# Patient Record
Sex: MALE | Race: WHITE | NOT HISPANIC OR LATINO | Employment: FULL TIME | URBAN - METROPOLITAN AREA
[De-identification: names, ages, dates, MRNs, and addresses within clinical notes are randomized per-mention and may not be internally consistent; named-entity substitution may affect disease eponyms.]

---

## 2020-03-06 ENCOUNTER — OFFICE VISIT (OUTPATIENT)
Dept: INTERNAL MEDICINE | Facility: CLINIC | Age: 25
End: 2020-03-06

## 2020-03-06 VITALS
TEMPERATURE: 98 F | SYSTOLIC BLOOD PRESSURE: 132 MMHG | DIASTOLIC BLOOD PRESSURE: 82 MMHG | WEIGHT: 183.63 LBS | HEART RATE: 79 BPM | OXYGEN SATURATION: 98 % | HEIGHT: 66 IN | BODY MASS INDEX: 29.51 KG/M2

## 2020-03-06 DIAGNOSIS — R19.7 DIARRHEA OF PRESUMED INFECTIOUS ORIGIN: Primary | ICD-10-CM

## 2020-03-06 PROCEDURE — 99203 OFFICE O/P NEW LOW 30 MIN: CPT | Mod: PBBFAC,PO | Performed by: FAMILY MEDICINE

## 2020-03-06 PROCEDURE — 99203 PR OFFICE/OUTPT VISIT, NEW, LEVL III, 30-44 MIN: ICD-10-PCS | Mod: S$PBB,,, | Performed by: FAMILY MEDICINE

## 2020-03-06 PROCEDURE — 99999 PR PBB SHADOW E&M-NEW PATIENT-LVL III: CPT | Mod: PBBFAC,,, | Performed by: FAMILY MEDICINE

## 2020-03-06 PROCEDURE — 99203 OFFICE O/P NEW LOW 30 MIN: CPT | Mod: S$PBB,,, | Performed by: FAMILY MEDICINE

## 2020-03-06 PROCEDURE — 99999 PR PBB SHADOW E&M-NEW PATIENT-LVL III: ICD-10-PCS | Mod: PBBFAC,,, | Performed by: FAMILY MEDICINE

## 2020-03-06 RX ORDER — METRONIDAZOLE 500 MG/1
500 TABLET ORAL EVERY 12 HOURS
Qty: 40 TABLET | Refills: 0 | Status: SHIPPED | OUTPATIENT
Start: 2020-03-06 | End: 2020-04-20 | Stop reason: SDUPTHER

## 2020-03-06 NOTE — PATIENT INSTRUCTIONS
Clostridium Difficile Infection  Clostridium difficile (C. diff) bacteria can be very harmful. They affect the intestinal tract. They can cause symptoms ranging from mild diarrhea to severe inflammation of the large intestine (colon). C. diff infection is most common during the days and weeks after treatment with antibiotics. Anyone can become infected. But the risk is greatly increased for people in hospitals and for people living in nursing homes or long-term care facilities. This is because antibiotic use is common there. Germs also spread easily in these places.    What causes C. diff infection?  The stomach and intestines have hundreds of kinds of bacteria. Many of these bacteria actually help keep harmful bacteria like C. diff from causing problems. Small amounts of C. diff are normal in the intestine and dont cause problems. When you take an antibiotic, the normal balance of good and bad bacteria may be affected. There may be too few good bacteria and too many harmful bacteria like C diff. In hospitals and nursing homes, C. diff may be spread from an infected person to others. This can happen when staff or visitors touch infected people or objects such as bed rails, stethoscopes, or bedpans and then touch other people or surfaces.  What are the symptoms of C. diff infection?  About half of people with C. diff infection have no symptoms. Yet they can still pass the infection to others. Others do have symptoms. These include:  · Watery diarrhea, which may contain mucus  · Pain and cramping  · Fever  Some who are infected develop serious problems. Symptoms include:  · Belly (abdominal) pain  · Abdominal swelling  · Nausea and vomiting  · Little or no diarrhea  How is C. diff infection diagnosed?  To confirm the infection, a sample of stool is tested for the bacteria or the toxins made by the bacteria.  How is C. diff infection treated?  Your healthcare provider will tell you to stop taking any antibiotics you  have been prescribed, based on your healthcare needs. He or she may prescribe different medicines as needed. In certain cases, you may be given an antibiotic directed at the C. diff infection. Talk with your healthcare provider before stopping or starting any medicines.  · Fluids are often given by IV (intravenously) through a vein. This helps replace fluids lost through diarrhea.  · In rare cases you may need surgery if treatment doesnt cure severe symptoms  To lessen symptoms:  · Drink plenty of fluids to replace water lost through diarrhea. Talk with your healthcare provider or nurse about which fluids are best.  · Follow your healthcare providers instructions for when and what to eat.  · Unless your healthcare provider tells you to do so, don't take medicines for diarrhea.  · Tell your healthcare provider if symptoms return. Even after treatment, C. diff may come back.  Your doctor may give you an additional medicine if your symptoms come back or you are at risk for another C. diff infection. This medicine is called bezlotoxumab. It is not an antibiotic, but it can help keep your C. diff symptoms from returning.  What are the complications of C. diff infection?  Complications include:  · Dehydration  · Electrolyte imbalances  · Low protein in the blood  · Severe widening (dilation) of the large intestine  · A hole (perforation) in the bowel  · Low blood pressure  · Kidney failure  · Inflammation or infection all over the body  · Death  How is C. diff prevented?  Hospitals and nursing homes take these steps to help prevent C. diff infections:  · Limiting use of antibiotics. Giving antibiotics only when needed can help reduce C. diff infections.  · Handwashing. Hospital staff should wash their hands before and after treating each person. They should also wash their hands after touching any surface in someone's' room. Soap and water work better than alcohol-based hand .  · Protective clothing. Healthcare  workers should wear gloves and a gown when entering the room of someone with C. diff infection. They should remove these items before leaving and then wash their hands.  · Private rooms. People with C. diff should be in private rooms. Or they may share rooms with others who have the same infection.  · Thorough cleaning. Equipment and rooms should be cleaned and disinfected every day.  · Education. Everyone should be shown the best ways to avoid infection.  You can do the following to help prevent C. diff:  · Take antibiotics only when you really need them. Antibiotics dont help treat illnesses caused by viruses. This includes colds and the flu. Dont ask for antibiotics from your healthcare provider if he or she says they wont work.  · When you are given antibiotics, take them as directed. Dont take more or less than the dosage prescribed. Do not take them for shorter or longer than your provider tells you to, even if you feel better.  · Wash your hands carefully. Do this after using the bathroom and before eating. Use plenty of soap and warm water. Alcohol-based hand  may not work against C. diff germs.  Everyone can help prevent C. diff:  In a hospital or care facility:  · Wash your hands well before and after visiting someone who has C. diff infection. Use soap and water. Alcohol-based hand  may not work against C. diff.  · If the staff asks you to, wear gloves. Take any other steps you are asked to follow to help prevent infection.  At home:  · If instructed, wear gloves when caring for a family member with C. diff infection. Throw the gloves away after each use. Then wash your hands well.  · Wash the persons clothes, bed linen, and towels separately. Use hot water. Use both detergent and liquid bleach.  · Disinfect surfaces in the persons room. This includes the phone, light switches, and remote controls.  Practice good handwashing:  · Use warm water and plenty of soap. Rub your hands  together well.  · Clean your whole hand. Wash under nails, between fingers, and up your wrists.  · Wash for at least 15 seconds to 20 seconds.   · Rinse. Let the water run down your fingers, not up your wrists.  · Dry your hands well. Then use a paper towel to turn off the faucet and open the door.  Date Last Reviewed: 1/1/2017  © 4722-9748 [employer]20, [employer]. [address] [place] [address], [place] [address]. All rights reserved. This information is not intended as a substitute for professional medical care. Always follow your healthcare professional's instructions.

## 2020-03-08 NOTE — PROGRESS NOTES
Subjective:      Patient ID: Danita Lui is a 25 y.o. male.    Chief Complaint: Diarrhea      Patient reports abdominal cramping, yellow soft frequent stool, chills for about 2 days now.  He reports stools are loose but not yet liquid, however they seem to be worsening.  His father, whom he he lives with, was recently diagnosed Clostridium difficile infection.    Review of Systems   Constitutional: Positive for chills and fatigue. Negative for fever and unexpected weight change.   Gastrointestinal: Positive for abdominal pain and diarrhea. Negative for constipation and vomiting.     History reviewed. No pertinent past medical history.       History reviewed. No pertinent surgical history.  Family History   Problem Relation Age of Onset    Heart disease Father      Social History     Socioeconomic History    Marital status: Single     Spouse name: Not on file    Number of children: Not on file    Years of education: Not on file    Highest education level: Not on file   Occupational History    Not on file   Social Needs    Financial resource strain: Not on file    Food insecurity:     Worry: Not on file     Inability: Not on file    Transportation needs:     Medical: Not on file     Non-medical: Not on file   Tobacco Use    Smoking status: Never Smoker    Smokeless tobacco: Never Used   Substance and Sexual Activity    Alcohol use: Not Currently    Drug use: Never    Sexual activity: Yes     Partners: Female   Lifestyle    Physical activity:     Days per week: Not on file     Minutes per session: Not on file    Stress: Not on file   Relationships    Social connections:     Talks on phone: Not on file     Gets together: Not on file     Attends L[employer]-day service: Not on file     Active member of club or organization: Not on file     Attends meetings of clubs or organizations: Not on file     Relationship status: Not on file   Other Topics Concern    Not on file   Social History Narrative    Not on  "file     Review of patient's allergies indicates:   Allergen Reactions    Pepto-bismol [bismuth subsalicylate] Nausea And Vomiting       Objective:       /82 (BP Location: Right arm)   Pulse 79   Temp 98.4 °F (36.9 °C) (Tympanic)   Ht 5' 6" (1.676 m)   Wt 83.3 kg (183 lb 10.3 oz)   SpO2 98%   BMI 29.64 kg/m²   Physical Exam   Constitutional: He is oriented to person, place, and time. He appears well-developed and well-nourished. No distress.   HENT:   Head: Normocephalic.   Right Ear: Hearing, tympanic membrane, external ear and ear canal normal.   Left Ear: Hearing, tympanic membrane, external ear and ear canal normal.   Nose: Nose normal. Right sinus exhibits no maxillary sinus tenderness and no frontal sinus tenderness. Left sinus exhibits no maxillary sinus tenderness and no frontal sinus tenderness.   Mouth/Throat: Uvula is midline, oropharynx is clear and moist and mucous membranes are normal. No oropharyngeal exudate.   Eyes: Pupils are equal, round, and reactive to light. Conjunctivae and EOM are normal.   Neck: Normal range of motion. Neck supple.   Cardiovascular: Normal rate and regular rhythm.   Pulmonary/Chest: Effort normal and breath sounds normal. No respiratory distress.   Abdominal: Soft. Bowel sounds are normal. He exhibits no mass. There is no tenderness. There is no rebound and no guarding. No hernia.   Neurological: He is alert and oriented to person, place, and time.   Skin: Skin is warm and dry. He is not diaphoretic.   Psychiatric: He has a normal mood and affect.   Nursing note and vitals reviewed.    Assessment:     1. Diarrhea of presumed infectious origin      Plan:   Diarrhea of presumed infectious origin    Other orders  -     metroNIDAZOLE (FLAGYL) 500 MG tablet; Take 1 tablet (500 mg total) by mouth every 12 (twelve) hours. for 20 days  Dispense: 40 tablet; Refill: 0     He is to return if symptoms do not resolve  Medication List with Changes/Refills   New Medications    " METRONIDAZOLE (FLAGYL) 500 MG TABLET    Take 1 tablet (500 mg total) by mouth every 12 (twelve) hours. for 20 days

## 2020-03-10 ENCOUNTER — TELEPHONE (OUTPATIENT)
Dept: INTERNAL MEDICINE | Facility: CLINIC | Age: 25
End: 2020-03-10

## 2020-03-10 NOTE — TELEPHONE ENCOUNTER
----- Message from Cinthia Figueroa sent at 3/10/2020  1:22 PM CDT -----  Contact: pt  Pt would like a call back to see how long he will be contagious . Pt can be reached at [phone]      Thanks,  Cinthia Figueroa

## 2020-03-10 NOTE — TELEPHONE ENCOUNTER
Patient called to ask how long contagious.     Spoke with Dr. Tobar and she stated that the patient must complete at least 10 days of his abt.     Patient was informed and verbalized understanding

## 2020-04-17 ENCOUNTER — TELEPHONE (OUTPATIENT)
Dept: INTERNAL MEDICINE | Facility: CLINIC | Age: 25
End: 2020-04-17

## 2020-04-17 RX ORDER — VANCOMYCIN HYDROCHLORIDE 125 MG/1
125 CAPSULE ORAL EVERY 6 HOURS
Qty: 56 CAPSULE | Refills: 0 | Status: SHIPPED | OUTPATIENT
Start: 2020-04-17 | End: 2020-04-20

## 2020-04-17 NOTE — TELEPHONE ENCOUNTER
----- Message from Shelley Campoverde sent at 4/17/2020  4:42 PM CDT -----  Contact: pt  Would like to consult with nurse regarding some questions she have, will not give any additional information. Please give a call back at [phone].            Thanks,  Shelley JETT

## 2020-04-17 NOTE — TELEPHONE ENCOUNTER
Patient called to state that he saw you regarding c-diff and it's coming back. He stated that he's been having diarrhea and soft stools for about 4/5 times a day for a few days.     He stated that his dad had a bm accident once.    Please advise

## 2020-04-17 NOTE — TELEPHONE ENCOUNTER
Please let them know I will call in vanc to their pharmacy - which one do they want to use? And recheck dad's stool test on Monday.

## 2020-04-20 ENCOUNTER — TELEPHONE (OUTPATIENT)
Dept: INTERNAL MEDICINE | Facility: CLINIC | Age: 25
End: 2020-04-20

## 2020-04-20 RX ORDER — METRONIDAZOLE 500 MG/1
500 TABLET ORAL EVERY 12 HOURS
Qty: 40 TABLET | Refills: 0 | Status: SHIPPED | OUTPATIENT
Start: 2020-04-20 | End: 2020-05-10

## 2020-04-20 NOTE — TELEPHONE ENCOUNTER
I spoke to patient's father this morning and he wanted to know if you can send in Flagyl instead of Vancomycin. He said he doesn't have insurance and the medication is to expensive. Flagyl worked well for him last time. He would like the prescription to go to [facility].

## 2020-04-20 NOTE — TELEPHONE ENCOUNTER
Spoke with patient and informed him of what the provider stated. Patient verbalized understanding

## 2020-05-18 ENCOUNTER — TELEPHONE (OUTPATIENT)
Dept: INTERNAL MEDICINE | Facility: CLINIC | Age: 25
End: 2020-05-18

## 2020-05-18 NOTE — TELEPHONE ENCOUNTER
I spoke to patient's father. He will call to find out which location is cheaper. They will call and us know which location is cheaper.

## 2020-05-18 NOTE — TELEPHONE ENCOUNTER
Called and spoken with pt, pt states he had c-diff about 2 month ago, symptoms went away but now he is experiencing stomach issue, pt c/o stomach pain after eating. Pt also think he has thrush, for tongue remains white even after brushing. Please advise

## 2020-05-18 NOTE — TELEPHONE ENCOUNTER
----- Message from Tamiko Jonatan sent at 5/18/2020 10:46 AM CDT -----  Contact: self  Type:  Needs Medical Advice    Who Called: Danita  Symptoms (please be specific): C, Diff   How long has patient had these symptoms:  Since March  Pharmacy name and phone #:    [facility] - [place], [place] - [address]  [address]  [facility] [address]  Phone: [phone] Fax: [phone]    [id number] - [facility] [address] [facility]  [address] [facility] [address]  Phone: [phone] Fax: [phone]    Would the patient rather a call back or a response via [facility]? call  Best Call Back Number: [phone]  Additional Information:

## 2020-05-18 NOTE — TELEPHONE ENCOUNTER
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 [facility]

## 2020-05-26 ENCOUNTER — TELEPHONE (OUTPATIENT)
Dept: INTERNAL MEDICINE | Facility: CLINIC | Age: 25
End: 2020-05-26

## 2020-05-26 NOTE — TELEPHONE ENCOUNTER
----- Message from Yomaira Lin sent at 5/26/2020 12:07 PM CDT -----  Contact: pt  .Type:  Needs Medical Advice    Who Called: TRAVIS CRAVEN   Symptoms (please be specific):    How long has patient had these symptoms:    Pharmacy name and phone #:    Would the patient rather a call back or a response via [facility]? call  Best Call Back Number:   [phone]    Additional Information: pt is requesting a call back from the nurse in regards to the pt having more problems with C-diff please

## 2020-05-26 NOTE — TELEPHONE ENCOUNTER
Patient said he finished antibiotic a week ago. He was fine for a week. He said he did yard work and got over heated then his stomach started hurting. He went to the bathroom 20 times. He said today it is a little better. He is wanting to know if you wanted to give him something else to take. He said he still experiencing gas and diarrhea. But he did say his stool is starting to become solid. He wanted to know if he is still contagious. Since his dad is doing better. He started taking probiotics today and he thinks that's what currently helping him. I asked him about you wanting to refer him to Gastro. He said that would be to expensive since he doesn't have insurance. He just wanted to see if you would be able to help since the medication is expensive itself. I did inform him your not here today but will be back tomorrow.

## 2020-05-27 NOTE — TELEPHONE ENCOUNTER
I informed patient. He said he is feeling 10 times better today. He said he has full of energy. He believes the probiotic did help him. He said he will wait since he feels better. He will call us if he wants us to send a referral.

## 2020-12-02 ENCOUNTER — TELEPHONE (OUTPATIENT)
Dept: INTERNAL MEDICINE | Facility: CLINIC | Age: 25
End: 2020-12-02

## 2020-12-02 NOTE — TELEPHONE ENCOUNTER
----- Message from Fany Terry sent at 12/1/2020  3:32 PM CST -----  Regarding: Medication  Pt is requesting call back in regards to questions about medication          [facility] call back at [phone]

## 2020-12-02 NOTE — TELEPHONE ENCOUNTER
Pt went to get covid tested yesterday/ and pt got a steroid shot/ they sent him home with prednisone and he is not wanting to take it. ,is it ok if he dont take it . He doesn't feel bad , they said to give it to him for a respitory infection.which he dont feel like he has ??? Please advise

## 2022-02-07 ENCOUNTER — IMMUNIZATION (OUTPATIENT)
Dept: PRIMARY CARE CLINIC | Facility: CLINIC | Age: 27
End: 2022-02-07
Payer: COMMERCIAL

## 2022-02-07 ENCOUNTER — OFFICE VISIT (OUTPATIENT)
Dept: INTERNAL MEDICINE | Facility: CLINIC | Age: 27
End: 2022-02-07
Payer: COMMERCIAL

## 2022-02-07 ENCOUNTER — HOSPITAL ENCOUNTER (OUTPATIENT)
Dept: RADIOLOGY | Facility: HOSPITAL | Age: 27
Discharge: HOME | End: 2022-02-07
Attending: FAMILY MEDICINE
Payer: COMMERCIAL

## 2022-02-07 VITALS
HEART RATE: 70 BPM | SYSTOLIC BLOOD PRESSURE: 130 MMHG | HEIGHT: 65 IN | DIASTOLIC BLOOD PRESSURE: 70 MMHG | OXYGEN SATURATION: 98 % | TEMPERATURE: 99 F | WEIGHT: 174.19 LBS | BODY MASS INDEX: 29.02 KG/M2

## 2022-02-07 DIAGNOSIS — G89.29 CHRONIC RIGHT SHOULDER PAIN: ICD-10-CM

## 2022-02-07 DIAGNOSIS — Z23 NEED FOR VACCINATION: Primary | ICD-10-CM

## 2022-02-07 DIAGNOSIS — R74.01 ELEVATED ALT MEASUREMENT: ICD-10-CM

## 2022-02-07 DIAGNOSIS — M25.511 CHRONIC RIGHT SHOULDER PAIN: ICD-10-CM

## 2022-02-07 DIAGNOSIS — M25.611 DECREASED ROM OF RIGHT SHOULDER: ICD-10-CM

## 2022-02-07 DIAGNOSIS — Z00.00 ROUTINE ADULT HEALTH MAINTENANCE: Primary | ICD-10-CM

## 2022-02-07 PROCEDURE — 99999 PR PBB SHADOW E&M-EST. PATIENT-LVL IV: ICD-10-PCS | Mod: PBBFAC,,, | Performed by: FAMILY MEDICINE

## 2022-02-07 PROCEDURE — 73030 X-RAY EXAM OF SHOULDER: CPT | Mod: TC,RT

## 2022-02-07 PROCEDURE — 1159F PR MEDICATION LIST DOCUMENTED IN MEDICAL RECORD: ICD-10-PCS | Mod: CPTII,S$GLB,, | Performed by: FAMILY MEDICINE

## 2022-02-07 PROCEDURE — 3008F BODY MASS INDEX DOCD: CPT | Mod: CPTII,S$GLB,, | Performed by: FAMILY MEDICINE

## 2022-02-07 PROCEDURE — 99214 OFFICE O/P EST MOD 30 MIN: CPT | Mod: S$GLB,,, | Performed by: FAMILY MEDICINE

## 2022-02-07 PROCEDURE — 73030 XR SHOULDER COMPLETE 2 OR MORE VIEWS RIGHT: ICD-10-PCS | Mod: 26,RT,, | Performed by: RADIOLOGY

## 2022-02-07 PROCEDURE — 0003A COVID-19, MRNA, LNP-S, PF, 30 MCG/0.3 ML DOSE VACCINE: CPT | Mod: CV19,PBBFAC | Performed by: FAMILY MEDICINE

## 2022-02-07 PROCEDURE — 99214 PR OFFICE/OUTPT VISIT, EST, LEVL IV, 30-39 MIN: ICD-10-PCS | Mod: S$GLB,,, | Performed by: FAMILY MEDICINE

## 2022-02-07 PROCEDURE — 3008F PR BODY MASS INDEX (BMI) DOCUMENTED: ICD-10-PCS | Mod: CPTII,S$GLB,, | Performed by: FAMILY MEDICINE

## 2022-02-07 PROCEDURE — 73030 X-RAY EXAM OF SHOULDER: CPT | Mod: 26,RT,, | Performed by: RADIOLOGY

## 2022-02-07 PROCEDURE — 3078F PR MOST RECENT DIASTOLIC BLOOD PRESSURE < 80 MM HG: ICD-10-PCS | Mod: CPTII,S$GLB,, | Performed by: FAMILY MEDICINE

## 2022-02-07 PROCEDURE — 1159F MED LIST DOCD IN RCRD: CPT | Mod: CPTII,S$GLB,, | Performed by: FAMILY MEDICINE

## 2022-02-07 PROCEDURE — 3075F SYST BP GE 130 - 139MM HG: CPT | Mod: CPTII,S$GLB,, | Performed by: FAMILY MEDICINE

## 2022-02-07 PROCEDURE — 99999 PR PBB SHADOW E&M-EST. PATIENT-LVL IV: CPT | Mod: PBBFAC,,, | Performed by: FAMILY MEDICINE

## 2022-02-07 PROCEDURE — 3075F PR MOST RECENT SYSTOLIC BLOOD PRESS GE 130-139MM HG: ICD-10-PCS | Mod: CPTII,S$GLB,, | Performed by: FAMILY MEDICINE

## 2022-02-07 PROCEDURE — 3078F DIAST BP <80 MM HG: CPT | Mod: CPTII,S$GLB,, | Performed by: FAMILY MEDICINE

## 2022-02-10 NOTE — PROGRESS NOTES
"Subjective:      Patient ID: Danita Lui is a 27 y.o. male.    Chief Complaint: Annual Exam and Shoulder Pain      Patient here for routine annual exam, recently got insurance and has not seen primary care in some time.  He is doing well overall.  Does report 6 years ago was pitching while playing softball, had a sharp severe pain in his right shoulder, since then has chronic pain in the right shoulder with certain movements, decreased range of motion.  No other acute problems today.    Shoulder Pain       Review of Systems   Constitutional: Negative for activity change and appetite change.   Respiratory: Negative for cough and shortness of breath.    Cardiovascular: Negative for leg swelling.   Gastrointestinal: Negative for abdominal pain.   Musculoskeletal: Positive for arthralgias.     History reviewed. No pertinent past medical history.       History reviewed. No pertinent surgical history.  Family History   Problem Relation Age of Onset    Heart disease Father      Social History     Socioeconomic History    Marital status: Single   Tobacco Use    Smoking status: Never Smoker    Smokeless tobacco: Never Used   Substance and Sexual Activity    Alcohol use: Not Currently    Drug use: Never    Sexual activity: Yes     Partners: Female     Review of patient's allergies indicates:   Allergen Reactions    Pepto-bismol [bismuth subsalicylate] Nausea And Vomiting       Objective:       /70 (BP Location: Left arm, Patient Position: Sitting, BP Method: Large (Manual))   Pulse 70   Temp 99 °F (37.2 °C) (Tympanic)   Ht 5' 5" (1.651 m)   Wt 79 kg (174 lb 2.6 oz)   SpO2 98%   BMI 28.98 kg/m²   Physical Exam  Vitals and nursing note reviewed.   Constitutional:       General: He is not in acute distress.     Appearance: Normal appearance. He is well-developed. He is not ill-appearing or diaphoretic.   HENT:      Head: Normocephalic.      Right Ear: Hearing, tympanic membrane, ear canal and external ear " normal.      Left Ear: Hearing, tympanic membrane, ear canal and external ear normal.      Nose: Nose normal.      Right Sinus: No maxillary sinus tenderness or frontal sinus tenderness.      Left Sinus: No maxillary sinus tenderness or frontal sinus tenderness.      Mouth/Throat:      Pharynx: Uvula midline. No oropharyngeal exudate.   Eyes:      Conjunctiva/sclera: Conjunctivae normal.      Pupils: Pupils are equal, round, and reactive to light.   Cardiovascular:      Rate and Rhythm: Normal rate and regular rhythm.   Pulmonary:      Effort: Pulmonary effort is normal. No respiratory distress.      Breath sounds: Normal breath sounds.   Abdominal:      General: Bowel sounds are normal.      Palpations: Abdomen is soft.      Tenderness: There is no abdominal tenderness. There is no guarding.      Hernia: No hernia is present.   Musculoskeletal:         General: No swelling, tenderness or deformity.      Cervical back: Normal range of motion and neck supple.      Comments: Decreased active range of motion right shoulder-unable to internally rotate, positive fountain   Skin:     General: Skin is warm and dry.      Capillary Refill: Capillary refill takes less than 2 seconds.   Neurological:      General: No focal deficit present.      Mental Status: He is alert and oriented to person, place, and time.   Psychiatric:         Mood and Affect: Mood normal.         Behavior: Behavior normal.         Thought Content: Thought content normal.         Judgment: Judgment normal.       Assessment:     1. Routine adult health maintenance    2. Chronic right shoulder pain    3. Decreased ROM of right shoulder      Plan:   Routine adult health maintenance  -     CBC Auto Differential; Future; Expected date: 02/07/2022  -     Comprehensive Metabolic Panel; Future; Expected date: 02/07/2022  -     Lipid Panel; Future; Expected date: 02/07/2022    Chronic right shoulder pain  -     X-ray Shoulder 2 or More Views Right; Future;  Expected date: 02/07/2022  -     Ambulatory referral/consult to Physical/Occupational Therapy; Future; Expected date: 02/14/2022    Decreased ROM of right shoulder  -     X-ray Shoulder 2 or More Views Right; Future; Expected date: 02/07/2022  -     Ambulatory referral/consult to Physical/Occupational Therapy; Future; Expected date: 02/14/2022

## 2022-06-01 ENCOUNTER — OFFICE VISIT (OUTPATIENT)
Dept: INTERNAL MEDICINE | Facility: CLINIC | Age: 27
End: 2022-06-01
Payer: COMMERCIAL

## 2022-06-01 VITALS
TEMPERATURE: 98 F | SYSTOLIC BLOOD PRESSURE: 124 MMHG | HEART RATE: 103 BPM | RESPIRATION RATE: 18 BRPM | WEIGHT: 169.06 LBS | OXYGEN SATURATION: 97 % | HEIGHT: 65 IN | BODY MASS INDEX: 28.17 KG/M2 | DIASTOLIC BLOOD PRESSURE: 76 MMHG

## 2022-06-01 DIAGNOSIS — U07.1 COVID-19: Primary | ICD-10-CM

## 2022-06-01 DIAGNOSIS — R50.9 FEVER, UNSPECIFIED FEVER CAUSE: ICD-10-CM

## 2022-06-01 DIAGNOSIS — J02.9 SORE THROAT: ICD-10-CM

## 2022-06-01 LAB
CTP QC/QA: YES
GROUP A STREP, MOLECULAR: NEGATIVE
INFLUENZA A, MOLECULAR: NEGATIVE
INFLUENZA B, MOLECULAR: NEGATIVE
SARS-COV-2 RDRP RESP QL NAA+PROBE: POSITIVE
SPECIMEN SOURCE: NORMAL

## 2022-06-01 PROCEDURE — 99999 PR PBB SHADOW E&M-EST. PATIENT-LVL III: CPT | Mod: PBBFAC,,, | Performed by: PHYSICIAN ASSISTANT

## 2022-06-01 PROCEDURE — U0002 COVID-19 LAB TEST NON-CDC: HCPCS | Mod: QW,S$GLB,, | Performed by: PHYSICIAN ASSISTANT

## 2022-06-01 PROCEDURE — 3074F SYST BP LT 130 MM HG: CPT | Mod: CPTII,S$GLB,, | Performed by: PHYSICIAN ASSISTANT

## 2022-06-01 PROCEDURE — 1159F PR MEDICATION LIST DOCUMENTED IN MEDICAL RECORD: ICD-10-PCS | Mod: CPTII,S$GLB,, | Performed by: PHYSICIAN ASSISTANT

## 2022-06-01 PROCEDURE — 1160F PR REVIEW ALL MEDS BY PRESCRIBER/CLIN PHARMACIST DOCUMENTED: ICD-10-PCS | Mod: CPTII,S$GLB,, | Performed by: PHYSICIAN ASSISTANT

## 2022-06-01 PROCEDURE — 1160F RVW MEDS BY RX/DR IN RCRD: CPT | Mod: CPTII,S$GLB,, | Performed by: PHYSICIAN ASSISTANT

## 2022-06-01 PROCEDURE — 3008F BODY MASS INDEX DOCD: CPT | Mod: CPTII,S$GLB,, | Performed by: PHYSICIAN ASSISTANT

## 2022-06-01 PROCEDURE — 1159F MED LIST DOCD IN RCRD: CPT | Mod: CPTII,S$GLB,, | Performed by: PHYSICIAN ASSISTANT

## 2022-06-01 PROCEDURE — 87651 STREP A DNA AMP PROBE: CPT | Performed by: PHYSICIAN ASSISTANT

## 2022-06-01 PROCEDURE — 3074F PR MOST RECENT SYSTOLIC BLOOD PRESSURE < 130 MM HG: ICD-10-PCS | Mod: CPTII,S$GLB,, | Performed by: PHYSICIAN ASSISTANT

## 2022-06-01 PROCEDURE — 3078F PR MOST RECENT DIASTOLIC BLOOD PRESSURE < 80 MM HG: ICD-10-PCS | Mod: CPTII,S$GLB,, | Performed by: PHYSICIAN ASSISTANT

## 2022-06-01 PROCEDURE — 99999 PR PBB SHADOW E&M-EST. PATIENT-LVL III: ICD-10-PCS | Mod: PBBFAC,,, | Performed by: PHYSICIAN ASSISTANT

## 2022-06-01 PROCEDURE — 99214 OFFICE O/P EST MOD 30 MIN: CPT | Mod: S$GLB,,, | Performed by: PHYSICIAN ASSISTANT

## 2022-06-01 PROCEDURE — 3008F PR BODY MASS INDEX (BMI) DOCUMENTED: ICD-10-PCS | Mod: CPTII,S$GLB,, | Performed by: PHYSICIAN ASSISTANT

## 2022-06-01 PROCEDURE — 87502 INFLUENZA DNA AMP PROBE: CPT | Performed by: PHYSICIAN ASSISTANT

## 2022-06-01 PROCEDURE — 3078F DIAST BP <80 MM HG: CPT | Mod: CPTII,S$GLB,, | Performed by: PHYSICIAN ASSISTANT

## 2022-06-01 PROCEDURE — 99214 PR OFFICE/OUTPT VISIT, EST, LEVL IV, 30-39 MIN: ICD-10-PCS | Mod: S$GLB,,, | Performed by: PHYSICIAN ASSISTANT

## 2022-06-01 PROCEDURE — U0002: ICD-10-PCS | Mod: QW,S$GLB,, | Performed by: PHYSICIAN ASSISTANT

## 2022-06-01 NOTE — PROGRESS NOTES
"Subjective:      Patient ID: Danita Lui is a 27 y.o. male.    Chief Complaint: Sore Throat and Chills    Patient is new to me, being seen today for sore throat and chills.     Last visit Feb 2022 with Dr. Tobar.     Fever   This is a new problem. The current episode started in the past 7 days (3days). The problem has been gradually worsening. Associated symptoms include congestion, coughing (rare, dry), headaches, nausea and a sore throat. Pertinent negatives include no abdominal pain, chest pain, diarrhea, rash, vomiting or wheezing. Treatments tried: anti-histamine, ibuprofen. The treatment provided no relief.     Review of Systems   Constitutional: Positive for chills, diaphoresis and fever (subjective, felt feverish).   HENT: Positive for congestion, rhinorrhea and sore throat.    Respiratory: Positive for cough (rare, dry). Negative for shortness of breath and wheezing.    Cardiovascular: Negative for chest pain.   Gastrointestinal: Positive for nausea. Negative for abdominal pain, constipation, diarrhea and vomiting.   Musculoskeletal: Positive for myalgias (body aches).   Skin: Negative for rash.   Neurological: Positive for headaches. Negative for dizziness and light-headedness.       Objective:   /76   Pulse 103   Temp 98.4 °F (36.9 °C) (Temporal)   Resp 18   Ht 5' 5" (1.651 m)   Wt 76.7 kg (169 lb 1.5 oz)   SpO2 97%   BMI 28.14 kg/m²   Physical Exam  Constitutional:       General: He is not in acute distress.     Appearance: Normal appearance. He is well-developed. He is not ill-appearing.   HENT:      Head: Normocephalic and atraumatic.   Cardiovascular:      Rate and Rhythm: Normal rate and regular rhythm.      Heart sounds: Normal heart sounds. No murmur heard.  Pulmonary:      Effort: Pulmonary effort is normal. No respiratory distress.      Breath sounds: Normal breath sounds. No decreased breath sounds.   Musculoskeletal:      Right lower leg: No edema.      Left lower leg: No " edema.   Skin:     General: Skin is warm and dry.      Findings: No rash.   Psychiatric:         Speech: Speech normal.         Behavior: Behavior normal.         Thought Content: Thought content normal.       Assessment:      1. COVID-19    2. Fever, unspecified fever cause    3. Sore throat       Plan:   COVID-19    Fever, unspecified fever cause  -     POCT COVID-19 Rapid Screening  -     Group A Strep, Molecular  -     Influenza A & B by Molecular    Sore throat  -     Group A Strep, Molecular  -     (Magic mouthwash) 1:1:1 diphenhydramine(Benadryl) 12.5mg/5ml liq, aluminum & magnesium hydroxide-simethicone (Maalox), LIDOcaine viscous 2%; Swish and spit 15 mLs every 4 (four) hours as needed (throat pain).  Dispense: 240 mL; Refill: 0      Advised patient based on time frame and symptomology this is likely a viral upper respiratory infection.  It does not require antibiotics at this time.    Will treat symptoms with OTC meds.      Discussed worsening signs/symptoms and when to return to clinic or go to ED.   Patient expresses understanding and agrees with treatment plan.

## 2022-06-03 ENCOUNTER — TELEPHONE (OUTPATIENT)
Dept: INTERNAL MEDICINE | Facility: CLINIC | Age: 27
End: 2022-06-03
Payer: COMMERCIAL

## 2022-06-03 NOTE — TELEPHONE ENCOUNTER
----- Message from Tracie Mosley PA-C sent at 6/3/2022 10:24 AM CDT -----  Is he still running fever?  Has he tried anything for congestion or nasal drip (anti-histamine or nasal spray)?  He may need a follow up next week if no improvement.  I recommend warm liquids, salt water gargles, magic mouthrinse prescription.  To Urgent Care through the weekend if worse.   ----- Message -----  From: Mynor Sepulveda MA  Sent: 6/3/2022  10:07 AM CDT  To: Tracie Mosley PA-C    Please advise before calling pt back. Do he needs to come back in clinic?  ----- Message -----  From: Sridevi Garcia  Sent: 6/3/2022  10:03 AM CDT  To: Melany SANCHEZ Staff    States his throat is worse. States his symptoms aren't going away. States he has quarantine for 5 days.  Please call pt [phone]. Thank you

## 2022-06-03 NOTE — TELEPHONE ENCOUNTER
Patient states his fever is lite but his throat as gone from sore to raw. He tried cough drops and anti-histamine and Rx that was sent. The mouth wash burns really bad. He tried warm liquids but not not salt water.

## 2022-06-03 NOTE — TELEPHONE ENCOUNTER
----- Message from Yecenia Barney sent at 6/3/2022  3:52 PM CDT -----  Pt is requesting a call back in regards to his sinus getting worse. Pt states that his throat is really sore and need to get some advice. Pt states that he left a message early this morning and still haven't received a call back. Pt can be reached at [phone]

## 2023-03-30 ENCOUNTER — LAB VISIT (OUTPATIENT)
Dept: LAB | Facility: HOSPITAL | Age: 28
End: 2023-03-30
Attending: FAMILY MEDICINE
Payer: COMMERCIAL

## 2023-03-30 ENCOUNTER — OFFICE VISIT (OUTPATIENT)
Dept: INTERNAL MEDICINE | Facility: CLINIC | Age: 28
End: 2023-03-30
Payer: COMMERCIAL

## 2023-03-30 VITALS
HEART RATE: 60 BPM | DIASTOLIC BLOOD PRESSURE: 72 MMHG | SYSTOLIC BLOOD PRESSURE: 128 MMHG | HEIGHT: 65 IN | WEIGHT: 179.44 LBS | TEMPERATURE: 98 F | OXYGEN SATURATION: 97 % | BODY MASS INDEX: 29.9 KG/M2

## 2023-03-30 DIAGNOSIS — Z00.00 ROUTINE ADULT HEALTH MAINTENANCE: ICD-10-CM

## 2023-03-30 DIAGNOSIS — Z00.00 ROUTINE ADULT HEALTH MAINTENANCE: Primary | ICD-10-CM

## 2023-03-30 LAB
ALBUMIN SERPL BCP-MCNC: 4.3 G/DL (ref 3.5–5.2)
ALP SERPL-CCNC: 84 U/L (ref 55–135)
ALT SERPL W/O P-5'-P-CCNC: 60 U/L (ref 10–44)
ANION GAP SERPL CALC-SCNC: 12 MMOL/L (ref 8–16)
AST SERPL-CCNC: 24 U/L (ref 10–40)
BASOPHILS # BLD AUTO: 0.08 K/UL (ref 0–0.2)
BASOPHILS NFR BLD: 1.5 % (ref 0–1.9)
BILIRUB SERPL-MCNC: 1.1 MG/DL (ref 0.1–1)
BUN SERPL-MCNC: 12 MG/DL (ref 6–20)
CALCIUM SERPL-MCNC: 9.7 MG/DL (ref 8.7–10.5)
CHLORIDE SERPL-SCNC: 105 MMOL/L (ref 95–110)
CHOLEST SERPL-MCNC: 217 MG/DL (ref 120–199)
CHOLEST/HDLC SERPL: 4.4 {RATIO} (ref 2–5)
CO2 SERPL-SCNC: 23 MMOL/L (ref 23–29)
CREAT SERPL-MCNC: 1.1 MG/DL (ref 0.5–1.4)
DIFFERENTIAL METHOD: NORMAL
EOSINOPHIL # BLD AUTO: 0.1 K/UL (ref 0–0.5)
EOSINOPHIL NFR BLD: 1.5 % (ref 0–8)
ERYTHROCYTE [DISTWIDTH] IN BLOOD BY AUTOMATED COUNT: 12.1 % (ref 11.5–14.5)
EST. GFR  (NO RACE VARIABLE): >60 ML/MIN/1.73 M^2
GLUCOSE SERPL-MCNC: 92 MG/DL (ref 70–110)
HCT VFR BLD AUTO: 49.9 % (ref 40–54)
HDLC SERPL-MCNC: 49 MG/DL (ref 40–75)
HDLC SERPL: 22.6 % (ref 20–50)
HGB BLD-MCNC: 16.4 G/DL (ref 14–18)
IMM GRANULOCYTES # BLD AUTO: 0.02 K/UL (ref 0–0.04)
IMM GRANULOCYTES NFR BLD AUTO: 0.4 % (ref 0–0.5)
LDLC SERPL CALC-MCNC: 143 MG/DL (ref 63–159)
LYMPHOCYTES # BLD AUTO: 1.7 K/UL (ref 1–4.8)
LYMPHOCYTES NFR BLD: 31.1 % (ref 18–48)
MCH RBC QN AUTO: 29 PG (ref 27–31)
MCHC RBC AUTO-ENTMCNC: 32.9 G/DL (ref 32–36)
MCV RBC AUTO: 88 FL (ref 82–98)
MONOCYTES # BLD AUTO: 0.5 K/UL (ref 0.3–1)
MONOCYTES NFR BLD: 9.5 % (ref 4–15)
NEUTROPHILS # BLD AUTO: 3.1 K/UL (ref 1.8–7.7)
NEUTROPHILS NFR BLD: 56 % (ref 38–73)
NONHDLC SERPL-MCNC: 168 MG/DL
NRBC BLD-RTO: 0 /100 WBC
PLATELET # BLD AUTO: 244 K/UL (ref 150–450)
PMV BLD AUTO: 10.3 FL (ref 9.2–12.9)
POTASSIUM SERPL-SCNC: 4.5 MMOL/L (ref 3.5–5.1)
PROT SERPL-MCNC: 7.2 G/DL (ref 6–8.4)
RBC # BLD AUTO: 5.65 M/UL (ref 4.6–6.2)
SODIUM SERPL-SCNC: 140 MMOL/L (ref 136–145)
TRIGL SERPL-MCNC: 125 MG/DL (ref 30–150)
WBC # BLD AUTO: 5.5 K/UL (ref 3.9–12.7)

## 2023-03-30 PROCEDURE — 80053 COMPREHEN METABOLIC PANEL: CPT | Performed by: FAMILY MEDICINE

## 2023-03-30 PROCEDURE — 3074F PR MOST RECENT SYSTOLIC BLOOD PRESSURE < 130 MM HG: ICD-10-PCS | Mod: CPTII,S$GLB,, | Performed by: FAMILY MEDICINE

## 2023-03-30 PROCEDURE — 3008F BODY MASS INDEX DOCD: CPT | Mod: CPTII,S$GLB,, | Performed by: FAMILY MEDICINE

## 2023-03-30 PROCEDURE — 85025 COMPLETE CBC W/AUTO DIFF WBC: CPT | Performed by: FAMILY MEDICINE

## 2023-03-30 PROCEDURE — 3008F PR BODY MASS INDEX (BMI) DOCUMENTED: ICD-10-PCS | Mod: CPTII,S$GLB,, | Performed by: FAMILY MEDICINE

## 2023-03-30 PROCEDURE — 99999 PR PBB SHADOW E&M-EST. PATIENT-LVL III: CPT | Mod: PBBFAC,,, | Performed by: FAMILY MEDICINE

## 2023-03-30 PROCEDURE — 3074F SYST BP LT 130 MM HG: CPT | Mod: CPTII,S$GLB,, | Performed by: FAMILY MEDICINE

## 2023-03-30 PROCEDURE — 80061 LIPID PANEL: CPT | Performed by: FAMILY MEDICINE

## 2023-03-30 PROCEDURE — 1159F MED LIST DOCD IN RCRD: CPT | Mod: CPTII,S$GLB,, | Performed by: FAMILY MEDICINE

## 2023-03-30 PROCEDURE — 99999 PR PBB SHADOW E&M-EST. PATIENT-LVL III: ICD-10-PCS | Mod: PBBFAC,,, | Performed by: FAMILY MEDICINE

## 2023-03-30 PROCEDURE — 99395 PR PREVENTIVE VISIT,EST,18-39: ICD-10-PCS | Mod: S$GLB,,, | Performed by: FAMILY MEDICINE

## 2023-03-30 PROCEDURE — 1159F PR MEDICATION LIST DOCUMENTED IN MEDICAL RECORD: ICD-10-PCS | Mod: CPTII,S$GLB,, | Performed by: FAMILY MEDICINE

## 2023-03-30 PROCEDURE — 3078F PR MOST RECENT DIASTOLIC BLOOD PRESSURE < 80 MM HG: ICD-10-PCS | Mod: CPTII,S$GLB,, | Performed by: FAMILY MEDICINE

## 2023-03-30 PROCEDURE — 36415 COLL VENOUS BLD VENIPUNCTURE: CPT | Mod: PO | Performed by: FAMILY MEDICINE

## 2023-03-30 PROCEDURE — 99395 PREV VISIT EST AGE 18-39: CPT | Mod: S$GLB,,, | Performed by: FAMILY MEDICINE

## 2023-03-30 PROCEDURE — 3078F DIAST BP <80 MM HG: CPT | Mod: CPTII,S$GLB,, | Performed by: FAMILY MEDICINE

## 2023-03-30 NOTE — PROGRESS NOTES
"Subjective:      Patient ID: Danita Lui is a 28 y.o. male.    Chief Complaint: Annual Exam      Patient here for routine annual wellness exam.  He is doing well overall.  Does reports some problems with chronic right shoulder pain, has had this for about 8 years now, believes he had injured his rotator cuff.  Last year had done physical therapy with some relief, scheduled to start this again soon    Review of Systems   Constitutional:  Negative for activity change and appetite change.   Respiratory:  Negative for cough and shortness of breath.    Cardiovascular:  Negative for leg swelling.   Gastrointestinal:  Negative for abdominal pain and diarrhea.   Musculoskeletal:  Positive for arthralgias.   History reviewed. No pertinent past medical history.       History reviewed. No pertinent surgical history.  Family History   Problem Relation Age of Onset    Heart disease Father      Social History     Socioeconomic History    Marital status: Single   Tobacco Use    Smoking status: Never    Smokeless tobacco: Never   Substance and Sexual Activity    Alcohol use: Not Currently    Drug use: Never    Sexual activity: Yes     Partners: Female     Review of patient's allergies indicates:   Allergen Reactions    Pepto-bismol [bismuth subsalicylate] Nausea And Vomiting       Objective:       /72 (BP Location: Left arm, Patient Position: Sitting, BP Method: Large (Manual))   Pulse 60   Temp 98.1 °F (36.7 °C) (Tympanic)   Ht 5' 5" (1.651 m)   Wt 81.4 kg (179 lb 7.3 oz)   SpO2 97%   BMI 29.86 kg/m²   Physical Exam  Vitals reviewed.   Constitutional:       General: He is not in acute distress.     Appearance: Normal appearance. He is well-developed. He is not ill-appearing or diaphoretic.   HENT:      Head: Normocephalic.      Right Ear: Hearing, tympanic membrane, ear canal and external ear normal.      Left Ear: Hearing, tympanic membrane, ear canal and external ear normal.      Nose: Nose normal.      Right " Sinus: No maxillary sinus tenderness or frontal sinus tenderness.      Left Sinus: No maxillary sinus tenderness or frontal sinus tenderness.      Mouth/Throat:      Pharynx: Uvula midline. No oropharyngeal exudate.   Eyes:      Conjunctiva/sclera: Conjunctivae normal.      Pupils: Pupils are equal, round, and reactive to light.   Cardiovascular:      Rate and Rhythm: Normal rate and regular rhythm.   Pulmonary:      Effort: Pulmonary effort is normal. No respiratory distress.      Breath sounds: Normal breath sounds.   Abdominal:      General: Bowel sounds are normal.      Palpations: Abdomen is soft.      Tenderness: There is no abdominal tenderness. There is no guarding.      Hernia: No hernia is present.   Musculoskeletal:         General: Normal range of motion.      Cervical back: Normal range of motion and neck supple.   Skin:     General: Skin is warm and dry.      Capillary Refill: Capillary refill takes less than 2 seconds.   Neurological:      General: No focal deficit present.      Mental Status: He is alert and oriented to person, place, and time.   Psychiatric:         Mood and Affect: Mood normal.         Behavior: Behavior normal.         Thought Content: Thought content normal.         Judgment: Judgment normal.     Assessment:     1. Routine adult health maintenance      Plan:   Routine adult health maintenance  -     CBC Auto Differential; Future; Expected date: 03/30/2023  -     Comprehensive Metabolic Panel; Future; Expected date: 03/30/2023  -     Lipid Panel; Future; Expected date: 03/30/2023      Medication List with Changes/Refills   Discontinued Medications    (MAGIC MOUTHWASH) 1:1:1 DIPHENHYDRAMINE(BENADRYL) 12.5MG/5ML LIQ, ALUMINUM & MAGNESIUM HYDROXIDE-SIMETHICONE (MAALOX), LIDOCAINE VISCOUS 2%    Swish and spit 15 mLs every 4 (four) hours as needed (throat pain).

## 2023-03-31 DIAGNOSIS — R74.01 ELEVATED ALT MEASUREMENT: Primary | ICD-10-CM
